# Patient Record
Sex: FEMALE | Race: WHITE | Employment: FULL TIME | URBAN - METROPOLITAN AREA
[De-identification: names, ages, dates, MRNs, and addresses within clinical notes are randomized per-mention and may not be internally consistent; named-entity substitution may affect disease eponyms.]

---

## 2024-06-01 ENCOUNTER — OFFICE VISIT (OUTPATIENT)
Age: 54
End: 2024-06-01

## 2024-06-01 VITALS
OXYGEN SATURATION: 96 % | HEART RATE: 83 BPM | SYSTOLIC BLOOD PRESSURE: 123 MMHG | TEMPERATURE: 98.2 F | RESPIRATION RATE: 16 BRPM | DIASTOLIC BLOOD PRESSURE: 72 MMHG

## 2024-06-01 DIAGNOSIS — T78.3XXA ANGIOEDEMA WITH URTICARIA: Primary | ICD-10-CM

## 2024-06-01 PROBLEM — F34.1 PERSISTENT DEPRESSIVE DISORDER: Status: ACTIVE | Noted: 2024-02-23

## 2024-06-01 RX ORDER — LAMOTRIGINE 200 MG/1
200 TABLET ORAL DAILY
COMMUNITY
Start: 2015-04-10

## 2024-06-01 RX ORDER — PREDNISONE 10 MG/1
TABLET ORAL
Qty: 42 TABLET | Refills: 0 | Status: SHIPPED | OUTPATIENT
Start: 2024-06-01

## 2024-06-01 RX ORDER — CETIRIZINE HYDROCHLORIDE 10 MG/1
10 TABLET ORAL DAILY
Qty: 14 TABLET | Refills: 0 | Status: SHIPPED | OUTPATIENT
Start: 2024-06-01 | End: 2024-06-15

## 2024-06-01 RX ORDER — BUSPIRONE HYDROCHLORIDE 7.5 MG/1
7.5 TABLET ORAL 2 TIMES DAILY
COMMUNITY
Start: 2022-06-17

## 2024-06-01 RX ORDER — QUETIAPINE FUMARATE 100 MG/1
TABLET, FILM COATED ORAL
COMMUNITY
Start: 2015-04-10

## 2024-06-01 NOTE — PROGRESS NOTES
Kendy Hubbard (:  1970) is a 53 y.o. female, here for evaluation of the following chief complaint(s):  Rash (Pt c/o rash on face x 4-5 weeks and is spreading slowly)    ASSESSMENT/PLAN:  Visit Diagnoses and Associated Orders       Angioedema with urticaria    -  Primary         ORDERS WITHOUT AN ASSOCIATED DIAGNOSIS    busPIRone (BUSPAR) 7.5 MG tablet [90684]      lamoTRIgine (LAMICTAL) 200 MG tablet [65741]      metFORMIN (GLUCOPHAGE) 500 MG tablet [47838]      QUEtiapine (SEROQUEL) 100 MG tablet [64229]      predniSONE (DELTASONE) 10 MG tablet [[id number]]      cetirizine (ZYRTEC) 10 MG tablet [[id number]]             Summary  - Patient's exam and complaint suggests that this rash a reaction to something in her environment.  - She will be following up with dermatology and allergy.   - I explained the warning signs of when to go to the emergency room.  - She will stop the kenalog cream and start oral steroids with zyrtec.    Differentials: Herpes Zoster, Atopic Dermatitis, Tinea Corporis, Scabies or other insect/parasite, Contact Dermatitis.    SUBJECTIVE/OBJECTIVE:  DAVID Larry presents to the clinic with a rash which onset 4 weeks ago. Hives are described as a red, itchy, and spreading skin rash that occurs on the face. She denies using new deodorant, shampoo, soap, detergent, cologne/perfume/body spray, clothes, medications, jewelry, lotions. Treatments tried include kenalog cream with no relief. She is concerned because it spread to her right eye this morning.  She denies changes in vision or any pain in the eye. She reports waking up in the morning with swollen eyelids and parts of her face excluding the lips.  She denies a sore throat.     Vitals:    24 1049   BP: 123/72   Pulse: 83   Resp: 16   Temp: 98.2 °F (36.8 °C)   SpO2: 96%     Physical Exam  Vitals reviewed.   Constitutional:       Appearance: She is normal weight.   HENT:      Head: Normocephalic.        Mouth/Throat:      Mouth: Mucous membranes